# Patient Record
Sex: MALE | URBAN - METROPOLITAN AREA
[De-identification: names, ages, dates, MRNs, and addresses within clinical notes are randomized per-mention and may not be internally consistent; named-entity substitution may affect disease eponyms.]

---

## 2024-12-27 ENCOUNTER — ALLIED HEALTH (OUTPATIENT)
Dept: INTEGRATIVE MEDICINE | Facility: CLINIC | Age: 74
End: 2024-12-27
Payer: MEDICARE

## 2024-12-27 DIAGNOSIS — G89.29 CHRONIC BILATERAL LOW BACK PAIN WITHOUT SCIATICA: ICD-10-CM

## 2024-12-27 DIAGNOSIS — M54.50 CHRONIC BILATERAL LOW BACK PAIN WITHOUT SCIATICA: ICD-10-CM

## 2024-12-27 DIAGNOSIS — M99.03 SEGMENTAL AND SOMATIC DYSFUNCTION OF LUMBAR REGION: Primary | ICD-10-CM

## 2024-12-27 DIAGNOSIS — M99.02 SEGMENTAL AND SOMATIC DYSFUNCTION OF THORACIC REGION: ICD-10-CM

## 2024-12-27 DIAGNOSIS — M53.3 SACRAL BACK PAIN: ICD-10-CM

## 2024-12-27 DIAGNOSIS — M79.9 POSTURAL STRAIN: ICD-10-CM

## 2024-12-27 DIAGNOSIS — M79.10 MYALGIA: ICD-10-CM

## 2024-12-27 DIAGNOSIS — M99.04 SEGMENTAL AND SOMATIC DYSFUNCTION OF SACRAL REGION: ICD-10-CM

## 2024-12-27 PROCEDURE — 98941 CHIROPRACT MANJ 3-4 REGIONS: CPT

## 2024-12-27 NOTE — PROGRESS NOTES
"Subjective   Patient ID: Antonio Gilliam is a 74 y.o. male who presents December 27, 2024 for chiropractic care.    (1/ ) VPCY    Today, the patient rates their degree of pain as a 3 out of 10 on the numeric pain rating scale.     Antonio returns today for continued chiropractic care. He was last seen two years ago, by my colleague Dr. Crane. He was initially being treated for low back pain, complicated by multiple bladder surgeries and radiation therapy for prostate cancer which is now in remission. He had blood testing recently which he states shows gradual decreases in prostate cancer markers. He denies any new imaging since he was last seen in this office, and denies seeing other chiropractors between his last appointment and today. He denies any changes in bowel or bladder control related to his current complaint of low back pain, and denies numbness, tingling, or weakness into lower extremities. The low back pain started again recently but is relatively mild, he reports mostly noticing a tightness and some limited movement, and reports sometimes feeling like something in his low back is going to \"pop\". He enjoyed the work that Dr. Crane had done on his low back previously, and states he found relief from pain with chiropractic care.     He does report having physical therapy for his right knee previously at [facility]. The patient denies any changes in health since his last encounter and will follow up as scheduled.    ______________________________________________________  INITIAL EVALUATION 10/6/2022 (SP):  Provocative factors include bending and transitions.   Palliative factors include rest and stretching.   Pain is described as ache and sharp.   Patient denies instability, grinding, popping, difficulty walking and weakness or numbness.      Pre-Treatment Pain Level: 5/10.   Mr. Alvarez presents today for the chiropractic evaluation and treatment of his back pain.      He explains that he has been to the " chiropractor in the past for many years whenever his back or any other musculoskeletal problems arose. However, he has not been to the chiropractor in at least 3 years for any type of treatments. He explains that over the last few years he has had some other health issues which took priority including surgery to remove his prostate in 2020 in which they also found a hernia which was repaired during surgery. Bladder/sling surgery in 2021 in which they found a hernia again and corrected it during the surgery, he had 2 months of radiation to the prostate this summer and is scheduled again for a third umbilical hernia surgery November 1.     With all of this he has had bouts of inactivity and recovery which has led to likely weakening of his pelvic floor core and lower back muscles. He explains that the last time he had a back issue it was after picking up a piece of paper from the floor. This current episode occurred on September 14 while washing his car. He explains that the sharp pain comes when he is standing up from a seated position or any other transitions. Pain is not localized to one side and is more so felt across the entire lumbosacral region. There is no current leg pain or pain in the glutes, but in the past he has had right sided sciatica.     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      Additional medical history: He takes a number of medications for his type 2 diabetes high lipids/cholesterol, high blood pressure and overactive bladder. He also explains that he was born with asthma although he is not currently actively receiving treatment for this condition           Objective   Physical Exam  Musculoskeletal:         General: No swelling, deformity or signs of injury.        Back:    Neurological:      General: No focal deficit present.      Mental Status: He is alert and  oriented to person, place, and time.      Cranial Nerves: No dysarthria or facial asymmetry.      Sensory: Sensation is intact.      Motor: Motor function is intact.      Coordination: Coordination is intact.      Gait: Gait is intact. Gait normal.         Palpation of the following regions revealed:  Thoracic: Thoracic paraspinals bilateral, muscular hypertonicity.  Middle trapezius bilateral, muscular hypertonicity.  Lower trapezius bilateral, muscular hypertonicity.  Rhomboids bilateral, muscular hypertonicity.  Lumbar: Lumbar paraspinals bilateral, muscular hypertonicity.  Quadratus lumborum bilateral, muscular hypertonicity.  Gluteal bilateral, hypertonicity and tenderness.  Piriformis bilateral, hypertonicity and tenderness.    Segmental Joint(s): Segmental joint dysfunction was assessed with motion palpation and is identified in the following areas:  Thoracic : T5, T7, T8, and T9  Lumbopelvic / Sacral SIJ : L3, L4, L5/S1, R SIJ, and L SIJ    Assessment/Plan   Today's Treatment Included: Spinal manipulation to the following regions of segmental dysfunction identified on examination, using age-appropriate and injury specific force.  Segmental Joint(s) Thoracic : T5, T7, T8, and T9 Segmental Joint(s) Lumbopelvic/Sacral SIJ : L3, L4, L5/S1, R SIJ, and L SIJ  Chiropractic manipulation treatment techniques utilized: Diversified CMT, Pelvic drop table technique, Flexion-distraction technique, and Direct Non-force/Joint Mobilization technique    Soft-tissue mobilization was performed in the following areas:  Thoracic Paraspinal mm. bilateral, Middle Trapezius bilateral, Lower Trapezius bilateral, and Rhomboids bilateral  Lumbar Paraspinal mm. bilateral, Quadratus Lumborum bilateral, Gluteal mm. Glute. Max.  and Glute. Med. bilateral, and Piriformis bilateral    Recommended follow-up in 2 week(s).     The patient tolerated today's treatment with little or no additional discomfort and was instructed to contact the  office for questions or concerns.

## 2025-01-03 ENCOUNTER — APPOINTMENT (OUTPATIENT)
Dept: INTEGRATIVE MEDICINE | Facility: CLINIC | Age: 75
End: 2025-01-03
Payer: MEDICARE

## 2025-01-03 DIAGNOSIS — M99.02 SEGMENTAL AND SOMATIC DYSFUNCTION OF THORACIC REGION: ICD-10-CM

## 2025-01-03 DIAGNOSIS — M79.10 MYALGIA: ICD-10-CM

## 2025-01-03 DIAGNOSIS — M79.9 POSTURAL STRAIN: ICD-10-CM

## 2025-01-03 DIAGNOSIS — M53.3 SACRAL BACK PAIN: ICD-10-CM

## 2025-01-03 DIAGNOSIS — M99.04 SEGMENTAL AND SOMATIC DYSFUNCTION OF SACRAL REGION: ICD-10-CM

## 2025-01-03 DIAGNOSIS — G89.29 CHRONIC BILATERAL LOW BACK PAIN WITHOUT SCIATICA: ICD-10-CM

## 2025-01-03 DIAGNOSIS — M99.03 SEGMENTAL AND SOMATIC DYSFUNCTION OF LUMBAR REGION: Primary | ICD-10-CM

## 2025-01-03 DIAGNOSIS — M54.50 CHRONIC BILATERAL LOW BACK PAIN WITHOUT SCIATICA: ICD-10-CM

## 2025-01-03 PROCEDURE — 98941 CHIROPRACT MANJ 3-4 REGIONS: CPT

## 2025-01-03 NOTE — PROGRESS NOTES
"Subjective   Patient ID: Antonio Gilliam is a 74 y.o. male who presents January 3, 2025 for chiropractic care.    (1/ ) VPCY    Today, the patient rates their degree of pain as a 3 out of 10 on the numeric pain rating scale.     Antonio \"Bill\" returns today for continued chiropractic care. He states he felt \"pretty good\" after his last appointment. He was sore for a day and then felt better up until a couple days ago, when he started having some pain in his R buttock again. No numbness, tingling, or weakness into leg.     _____________________________________  Last visit (12/27/24):  Antonio returns today for continued chiropractic care. He was last seen two years ago, by my colleague Dr. Crane. He was initially being treated for low back pain, complicated by multiple bladder surgeries and radiation therapy for prostate cancer which is now in remission. He had blood testing recently which he states shows gradual decreases in prostate cancer markers. He denies any new imaging since he was last seen in this office, and denies seeing other chiropractors between his last appointment and today. He denies any changes in bowel or bladder control related to his current complaint of low back pain, and denies numbness, tingling, or weakness into lower extremities. The low back pain started again recently but is relatively mild, he reports mostly noticing a tightness and some limited movement, and reports sometimes feeling like something in his low back is going to \"pop\". He enjoyed the work that Dr. Crane had done on his low back previously, and states he found relief from pain with chiropractic care.     He does report having physical therapy for his right knee previously at [facility]. The patient denies any changes in health since his last encounter and will follow up as scheduled.    ______________________________________________________  INITIAL EVALUATION 10/6/2022 (SP):  Provocative factors include bending and transitions. "   Palliative factors include rest and stretching.   Pain is described as ache and sharp.   Patient denies instability, grinding, popping, difficulty walking and weakness or numbness.      Pre-Treatment Pain Level: 5/10.   Mr. Alvarez presents today for the chiropractic evaluation and treatment of his back pain.      He explains that he has been to the chiropractor in the past for many years whenever his back or any other musculoskeletal problems arose. However, he has not been to the chiropractor in at least 3 years for any type of treatments. He explains that over the last few years he has had some other health issues which took priority including surgery to remove his prostate in 2020 in which they also found a hernia which was repaired during surgery. Bladder/sling surgery in 2021 in which they found a hernia again and corrected it during the surgery, he had 2 months of radiation to the prostate this summer and is scheduled again for a third umbilical hernia surgery November 1.     With all of this he has had bouts of inactivity and recovery which has led to likely weakening of his pelvic floor core and lower back muscles. He explains that the last time he had a back issue it was after picking up a piece of paper from the floor. This current episode occurred on September 14 while washing his car. He explains that the sharp pain comes when he is standing up from a seated position or any other transitions. Pain is not localized to one side and is more so felt across the entire lumbosacral region. There is no current leg pain or pain in the glutes, but in the past he has had right sided sciatica.     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      Additional medical history: He takes a number of medications for his type 2 diabetes high lipids/cholesterol, high blood pressure and overactive  bladder. He also explains that he was born with asthma although he is not currently actively receiving treatment for this condition           Objective   Physical Exam  Musculoskeletal:         General: No swelling, deformity or signs of injury.        Back:    Neurological:      General: No focal deficit present.      Mental Status: He is alert and oriented to person, place, and time.      Cranial Nerves: No dysarthria or facial asymmetry.      Sensory: Sensation is intact.      Motor: Motor function is intact.      Coordination: Coordination is intact.      Gait: Gait is intact. Gait normal.         Palpation of the following regions revealed:  Thoracic: Thoracic paraspinals bilateral, muscular hypertonicity.  Middle trapezius bilateral, muscular hypertonicity.  Lower trapezius bilateral, muscular hypertonicity.  Rhomboids bilateral, muscular hypertonicity.  Lumbar: Lumbar paraspinals bilateral, muscular hypertonicity.  Quadratus lumborum bilateral, muscular hypertonicity.  Gluteal right, muscular hypertonicity.  Piriformis right, muscular hypertonicity.    Segmental Joint(s): Segmental joint dysfunction was assessed with motion palpation and is identified in the following areas:  Thoracic : T5, T7, T8, and T9  Lumbopelvic / Sacral SIJ : L3, L4, L5/S1, R SIJ, and L SIJ    Assessment/Plan   Today's Treatment Included: Spinal manipulation to the following regions of segmental dysfunction identified on examination, using age-appropriate and injury specific force.  Segmental Joint(s) Thoracic : T4, T6, T7, and T8 Segmental Joint(s) Lumbopelvic/Sacral SIJ : L3, L4, L5/S1, R SIJ, and L SIJ  Chiropractic manipulation treatment techniques utilized: Diversified CMT, Pelvic drop table technique, Flexion-distraction technique, and Direct Non-force/Joint Mobilization technique  Soft tissue mobilization techniques utilized during treatment: Active Release Technique and Ischemic compression    Soft-tissue mobilization was  performed in the following areas:  Thoracic Paraspinal mm. bilateral, Middle Trapezius bilateral, Lower Trapezius bilateral, and Rhomboids bilateral  Lumbar Paraspinal mm. bilateral, Quadratus Lumborum bilateral, Gluteal mm. Glute. Max.  and Glute. Med. bilateral, and Piriformis bilateral    Recommended follow-up in 2 week(s).     The patient tolerated today's treatment with little or no additional discomfort and was instructed to contact the office for questions or concerns.

## 2025-01-10 ENCOUNTER — APPOINTMENT (OUTPATIENT)
Dept: INTEGRATIVE MEDICINE | Facility: CLINIC | Age: 75
End: 2025-01-10
Payer: MEDICARE

## 2025-01-10 DIAGNOSIS — G89.29 CHRONIC BILATERAL LOW BACK PAIN WITHOUT SCIATICA: ICD-10-CM

## 2025-01-10 DIAGNOSIS — M53.3 SACRAL BACK PAIN: ICD-10-CM

## 2025-01-10 DIAGNOSIS — M99.02 SEGMENTAL AND SOMATIC DYSFUNCTION OF THORACIC REGION: ICD-10-CM

## 2025-01-10 DIAGNOSIS — M54.50 CHRONIC BILATERAL LOW BACK PAIN WITHOUT SCIATICA: ICD-10-CM

## 2025-01-10 DIAGNOSIS — M79.9 POSTURAL STRAIN: ICD-10-CM

## 2025-01-10 DIAGNOSIS — M99.04 SEGMENTAL AND SOMATIC DYSFUNCTION OF SACRAL REGION: ICD-10-CM

## 2025-01-10 DIAGNOSIS — M99.03 SEGMENTAL AND SOMATIC DYSFUNCTION OF LUMBAR REGION: Primary | ICD-10-CM

## 2025-01-10 DIAGNOSIS — M79.10 MYALGIA: ICD-10-CM

## 2025-01-10 PROCEDURE — 98941 CHIROPRACT MANJ 3-4 REGIONS: CPT

## 2025-01-10 NOTE — PROGRESS NOTES
"Subjective   Patient ID: Antonio Gilliam is a 74 y.o. male who presents January 10, 2025 for chiropractic care.    (3) VPCY    Today, the patient rates their degree of pain as a 3 out of 10 on the numeric pain rating scale.     Chalo returns today for continued care of his low back pain. He has been feeling good still, just wanted to have one more visit before he leaves for [place] next week. He will be gone for 3 months and we will follow up when he returns from his trip.   ________________________________  Last visit (1/3/24):  Antonio \"Chalo\" returns today for continued chiropractic care. He states he felt \"pretty good\" after his last appointment. He was sore for a day and then felt better up until a couple days ago, when he started having some pain in his R buttock again. No numbness, tingling, or weakness into leg.            Objective   Physical Exam  Musculoskeletal:         General: No swelling, deformity or signs of injury.        Back:    Neurological:      General: No focal deficit present.      Mental Status: He is alert and oriented to person, place, and time.      Cranial Nerves: No dysarthria or facial asymmetry.      Sensory: Sensation is intact.      Motor: Motor function is intact.      Coordination: Coordination is intact.      Gait: Gait is intact. Gait normal.       Palpation of the following regions revealed:  Thoracic: Thoracic paraspinals bilateral, muscular hypertonicity.  Middle trapezius bilateral, muscular hypertonicity.  Lower trapezius bilateral, muscular hypertonicity.  Rhomboids bilateral, muscular hypertonicity.  Lumbar: Lumbar paraspinals bilateral, muscular hypertonicity.  Quadratus lumborum bilateral, muscular hypertonicity.  Gluteal right, muscular hypertonicity.  Piriformis right, muscular hypertonicity.    Segmental Joint(s): Segmental joint dysfunction was assessed with motion palpation and is identified in the following areas:  Thoracic : T5, T7, T8, and T9  Lumbopelvic / Sacral SIJ " : L3, L4, L5/S1, R SIJ, and L SIJ    Assessment/Plan   Today's Treatment Included: Spinal manipulation to the following regions of segmental dysfunction identified on examination, using age-appropriate and injury specific force.  Segmental Joint(s) Thoracic : T7, T8, and T9 Segmental Joint(s) Lumbopelvic/Sacral SIJ : L3, L4, L5/S1, and R SIJ  Chiropractic manipulation treatment techniques utilized: Diversified CMT, Pelvic drop table technique, Flexion-distraction technique, and Direct Non-force/Joint Mobilization technique  Soft tissue mobilization techniques utilized during treatment: Active Release Technique and Ischemic compression    Soft-tissue mobilization was performed in the following areas:  Thoracic Paraspinal mm. bilateral, Middle Trapezius bilateral, Lower Trapezius bilateral, and Rhomboids bilateral  Lumbar Paraspinal mm. bilateral, Quadratus Lumborum bilateral, Gluteal mm. Glute. Max.  and Glute. Med. bilateral, and Piriformis bilateral    Recommended follow-up in 2 week(s).     The patient tolerated today's treatment with little or no additional discomfort and was instructed to contact the office for questions or concerns.

## 2025-05-05 ENCOUNTER — APPOINTMENT (OUTPATIENT)
Dept: INTEGRATIVE MEDICINE | Facility: CLINIC | Age: 75
End: 2025-05-05
Payer: MEDICARE

## 2025-05-05 DIAGNOSIS — M53.3 SACRAL BACK PAIN: ICD-10-CM

## 2025-05-05 DIAGNOSIS — M99.02 SEGMENTAL AND SOMATIC DYSFUNCTION OF THORACIC REGION: ICD-10-CM

## 2025-05-05 DIAGNOSIS — M54.50 CHRONIC BILATERAL LOW BACK PAIN WITHOUT SCIATICA: ICD-10-CM

## 2025-05-05 DIAGNOSIS — M79.9 POSTURAL STRAIN: ICD-10-CM

## 2025-05-05 DIAGNOSIS — G89.29 CHRONIC BILATERAL LOW BACK PAIN WITHOUT SCIATICA: ICD-10-CM

## 2025-05-05 DIAGNOSIS — M79.10 MYALGIA: ICD-10-CM

## 2025-05-05 DIAGNOSIS — M99.04 SEGMENTAL AND SOMATIC DYSFUNCTION OF SACRAL REGION: ICD-10-CM

## 2025-05-05 DIAGNOSIS — M99.03 SEGMENTAL AND SOMATIC DYSFUNCTION OF LUMBAR REGION: Primary | ICD-10-CM

## 2025-05-05 PROCEDURE — 98941 CHIROPRACT MANJ 3-4 REGIONS: CPT

## 2025-05-05 NOTE — PROGRESS NOTES
"Subjective   Patient ID: Antonio Gilliam \"Chalo\" is a 74 y.o. male who presents May 5, 2025 for chiropractic care.    (3) VPCY    Today, the patient rates their degree of pain as a 5 out of 10 on the numeric pain rating scale.     Chalo returned from his trips to [place] and [place]. He has been having more low back pain, mentions it was particularly bad on Friday. It is localized primarily to the right side of his low back and into his glute. He thinks it may be related to being more active since he has been back. We did dry needle today, was a little sore leaving appointment. Discussed following up again within a week or two depending on how he is feeling in the next few days.   ___________________________  Last visit (1/10/2025):  Chalo returns today for continued care of his low back pain. He has been feeling good still, just wanted to have one more visit before he leaves for [place] next week. He will be gone for 3 months and we will follow up when he returns from his trip.          Objective   Physical Exam  Musculoskeletal:         General: No swelling, deformity or signs of injury.        Back:    Neurological:      General: No focal deficit present.      Mental Status: He is alert and oriented to person, place, and time.      Cranial Nerves: No dysarthria or facial asymmetry.      Sensory: Sensation is intact.      Motor: Motor function is intact.      Coordination: Coordination is intact.      Gait: Gait is intact. Gait normal.         Palpation of the following regions revealed:  Thoracic: Thoracic paraspinals bilateral, muscular hypertonicity.  Middle trapezius bilateral, muscular hypertonicity.  Lower trapezius bilateral, muscular hypertonicity.  Rhomboids bilateral, muscular hypertonicity.  Lumbar: Lumbar paraspinals bilateral, muscular hypertonicity.  Quadratus lumborum bilateral, muscular hypertonicity.  Gluteal right, muscular hypertonicity.  Piriformis right, muscular hypertonicity.    Segmental Joint(s): " Segmental joint dysfunction was assessed with motion palpation and is identified in the following areas:  Thoracic : T5, T7, T8, and T9  Lumbopelvic / Sacral SIJ : L2, L3, L4, L5/S1, R SIJ, and L SIJ    Assessment/Plan   Today's Treatment Included: Spinal manipulation to the following regions of segmental dysfunction identified on examination, using age-appropriate and injury specific force.  Segmental Joint(s) Thoracic : T5, T7, T8, and T9 Segmental Joint(s) Lumbopelvic/Sacral SIJ : L2, L3, L4, L5/S1, and R SIJ  Chiropractic manipulation treatment techniques utilized: Diversified CMT, Pelvic drop table technique, Flexion-distraction technique, and Direct Non-force/Joint Mobilization technique  Soft tissue mobilization techniques utilized during treatment: Active Release Technique and Ischemic compression  Integrative Dry Needling (IDN) - Needles in/out:  9.    IDN prone B lumbar PS, B gluteal, superior cluneal, R piriformis    Soft-tissue mobilization was performed in the following areas:  Thoracic Paraspinal mm. bilateral, Middle Trapezius bilateral, Lower Trapezius bilateral, and Rhomboids bilateral  Lumbar Paraspinal mm. bilateral, Quadratus Lumborum bilateral, Gluteal mm. Glute. Max.  and Glute. Med. bilateral, and Piriformis bilateral    Recommended follow-up in 2 week(s).     The patient tolerated today's treatment with little or no additional discomfort and was instructed to contact the office for questions or concerns.

## 2025-05-13 ENCOUNTER — ALLIED HEALTH (OUTPATIENT)
Dept: INTEGRATIVE MEDICINE | Facility: CLINIC | Age: 75
End: 2025-05-13
Payer: MEDICARE

## 2025-05-13 DIAGNOSIS — M99.03 SEGMENTAL AND SOMATIC DYSFUNCTION OF LUMBAR REGION: Primary | ICD-10-CM

## 2025-05-13 DIAGNOSIS — M53.3 SACRAL BACK PAIN: ICD-10-CM

## 2025-05-13 DIAGNOSIS — M54.50 CHRONIC BILATERAL LOW BACK PAIN WITHOUT SCIATICA: ICD-10-CM

## 2025-05-13 DIAGNOSIS — M99.02 SEGMENTAL AND SOMATIC DYSFUNCTION OF THORACIC REGION: ICD-10-CM

## 2025-05-13 DIAGNOSIS — G89.29 CHRONIC BILATERAL LOW BACK PAIN WITHOUT SCIATICA: ICD-10-CM

## 2025-05-13 DIAGNOSIS — M79.10 MYALGIA: ICD-10-CM

## 2025-05-13 DIAGNOSIS — M79.9 POSTURAL STRAIN: ICD-10-CM

## 2025-05-13 DIAGNOSIS — M99.04 SEGMENTAL AND SOMATIC DYSFUNCTION OF SACRAL REGION: ICD-10-CM

## 2025-05-13 PROCEDURE — 98941 CHIROPRACT MANJ 3-4 REGIONS: CPT

## 2025-05-13 NOTE — PROGRESS NOTES
"Subjective   Patient ID: Antonio Gilliam \"Chalo\" is a 74 y.o. male who presents May 13, 2025 for chiropractic care.    (4) VPCY    Today, the patient rates their degree of pain as a 7 out of 10 on the numeric pain rating scale.     Chalo has had some additional low back pain since his last visit. It is localized to the left side of the lumbar spine. He is not sure what may have caused this, but we will follow up sooner to make sure we stay on top of it.   _____________________________  Last visit (5/5/2025):  Chalo returned from his trips to [place] and [place]. He has been having more low back pain, mentions it was particularly bad on Friday. It is localized primarily to the right side of his low back and into his glute. He thinks it may be related to being more active since he has been back. We did dry needle today, was a little sore leaving appointment. Discussed following up again within a week or two depending on how he is feeling in the next few days.          Objective   Physical Exam  Musculoskeletal:         General: No swelling, deformity or signs of injury.        Back:    Neurological:      General: No focal deficit present.      Mental Status: He is alert and oriented to person, place, and time.      Cranial Nerves: No dysarthria or facial asymmetry.      Sensory: Sensation is intact.      Motor: Motor function is intact.      Coordination: Coordination is intact.      Gait: Gait is intact. Gait normal.         Palpation of the following regions revealed:  Thoracic: Thoracic paraspinals bilateral, muscular hypertonicity.  Middle trapezius bilateral, muscular hypertonicity.  Lower trapezius bilateral, muscular hypertonicity.  Rhomboids bilateral, muscular hypertonicity.  Lumbar: Lumbar paraspinals left, muscular hypertonicity.  Quadratus lumborum left, muscular hypertonicity.  Thoracolumbar fascia left, muscular hypertonicity.  Gluteal left, muscular hypertonicity.  Piriformis left, muscular " hypertonicity.    Segmental Joint(s): Segmental joint dysfunction was assessed with motion palpation and is identified in the following areas:  Thoracic : T7, T8, and T9  Lumbopelvic / Sacral SIJ : L2, L3, L4, L5/S1, R SIJ, and L SIJ    Assessment/Plan   Today's Treatment Included: Spinal manipulation to the following regions of segmental dysfunction identified on examination, using age-appropriate and injury specific force.  Segmental Joint(s) Thoracic : T7, T8, and T9 Segmental Joint(s) Lumbopelvic/Sacral SIJ : L2, L3, L4, L5/S1, and L SIJ  Chiropractic manipulation treatment techniques utilized: Diversified CMT, Pelvic drop table technique, Flexion-distraction technique, and Direct Non-force/Joint Mobilization technique  Soft tissue mobilization techniques utilized during treatment: Active Release Technique and Ischemic compression  Integrative Dry Needling (IDN) - Needles in/out:  6.    IDN prone B lumbar PS, L gluteal, L superior cluneal    Soft-tissue mobilization was performed in the following areas:  Thoracic Paraspinal mm. bilateral, Middle Trapezius bilateral, Lower Trapezius bilateral, and Rhomboids bilateral  Lumbar Paraspinal mm. bilateral, Quadratus Lumborum bilateral, Gluteal mm. Glute. Max.  and Glute. Med. bilateral, and Piriformis bilateral    Recommended follow-up in 1 week(s).     The patient tolerated today's treatment with little or no additional discomfort and was instructed to contact the office for questions or concerns.

## 2025-05-16 ENCOUNTER — APPOINTMENT (OUTPATIENT)
Dept: INTEGRATIVE MEDICINE | Facility: CLINIC | Age: 75
End: 2025-05-16
Payer: MEDICARE

## 2025-05-19 ENCOUNTER — APPOINTMENT (OUTPATIENT)
Dept: INTEGRATIVE MEDICINE | Facility: CLINIC | Age: 75
End: 2025-05-19
Payer: MEDICARE

## 2025-05-19 DIAGNOSIS — M99.02 SEGMENTAL AND SOMATIC DYSFUNCTION OF THORACIC REGION: ICD-10-CM

## 2025-05-19 DIAGNOSIS — M54.50 CHRONIC BILATERAL LOW BACK PAIN WITHOUT SCIATICA: ICD-10-CM

## 2025-05-19 DIAGNOSIS — M99.03 SEGMENTAL AND SOMATIC DYSFUNCTION OF LUMBAR REGION: Primary | ICD-10-CM

## 2025-05-19 DIAGNOSIS — M79.9 POSTURAL STRAIN: ICD-10-CM

## 2025-05-19 DIAGNOSIS — M53.3 SACRAL BACK PAIN: ICD-10-CM

## 2025-05-19 DIAGNOSIS — M79.10 MYALGIA: ICD-10-CM

## 2025-05-19 DIAGNOSIS — M99.04 SEGMENTAL AND SOMATIC DYSFUNCTION OF SACRAL REGION: ICD-10-CM

## 2025-05-19 DIAGNOSIS — G89.29 CHRONIC BILATERAL LOW BACK PAIN WITHOUT SCIATICA: ICD-10-CM

## 2025-05-19 PROCEDURE — 98941 CHIROPRACT MANJ 3-4 REGIONS: CPT

## 2025-05-19 NOTE — PROGRESS NOTES
"Subjective   Patient ID: Antonio Gilliam \"Chalo\" is a 74 y.o. male who presents May 19, 2025 for chiropractic care.    (5) VPCY    Today, the patient rates their degree of pain as a 4 out of 10 on the numeric pain rating scale.     Chalo has minimal pain today compared to our last couple of visits, still tension present but not as bad as it has been. 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   _______________________________  Last visit (5/13/2025):  Chalo has had some additional low back pain since his last visit. It is localized to the left side of the lumbar spine. He is not sure what may have caused this, but we will follow up sooner to make sure we stay on top of it.          Objective   Physical Exam  Musculoskeletal:         General: No swelling, deformity or signs of injury.        Back:    Neurological:      General: No focal deficit present.      Mental Status: He is alert and oriented to person, place, and time.      Cranial Nerves: No dysarthria or facial asymmetry.      Sensory: Sensation is intact.      Motor: Motor function is intact.      Coordination: Coordination is intact.      Gait: Gait is intact. Gait normal.       Palpation of the following regions revealed:  Thoracic: Thoracic paraspinals bilateral, muscular hypertonicity.  Middle trapezius bilateral, muscular hypertonicity.  Lower trapezius bilateral, muscular hypertonicity.  Rhomboids bilateral, muscular hypertonicity.  Lumbar: Lumbar paraspinals bilateral, muscular hypertonicity.  Quadratus lumborum bilateral, muscular hypertonicity.  Thoracolumbar fascia bilateral, muscular hypertonicity.  Gluteal bilateral, muscular hypertonicity.  Piriformis bilateral, muscular hypertonicity.    Segmental Joint(s): Segmental joint dysfunction was assessed with motion palpation and is " identified in the following areas:  Thoracic : T5, T7, T8, and T9  Lumbopelvic / Sacral SIJ : L2, L3, L4, L5/S1, R SIJ, and L SIJ    Assessment/Plan   Today's Treatment Included: Spinal manipulation to the following regions of segmental dysfunction identified on examination, using age-appropriate and injury specific force.  Segmental Joint(s) Thoracic : T5, T7, T8, and T9 Segmental Joint(s) Lumbopelvic/Sacral SIJ : L2, L3, L4, L5/S1, R SIJ, and L SIJ  Chiropractic manipulation treatment techniques utilized: Diversified CMT, Pelvic drop table technique, Flexion-distraction technique, and Low force  Soft tissue mobilization techniques utilized during treatment: Active Release Technique and Ischemic compression  Integrative Dry Needling (IDN) - Needles in/out:  10.    IDN prone B lumbar PS, B gluteal, B superior cluneal    Soft-tissue mobilization was performed in the following areas:  Thoracic Paraspinal mm. bilateral, Middle Trapezius bilateral, Lower Trapezius bilateral, and Rhomboids bilateral  Lumbar Paraspinal mm. bilateral, Quadratus Lumborum bilateral, Gluteal mm. Glute. Max.  and Glute. Med. bilateral, and Piriformis bilateral    Recommended follow-up in 1 month(s).     The patient tolerated today's treatment with little or no additional discomfort and was instructed to contact the office for questions or concerns.

## 2025-06-12 ENCOUNTER — ALLIED HEALTH (OUTPATIENT)
Dept: INTEGRATIVE MEDICINE | Facility: CLINIC | Age: 75
End: 2025-06-12
Payer: MEDICARE

## 2025-06-12 DIAGNOSIS — M79.9 POSTURAL STRAIN: ICD-10-CM

## 2025-06-12 DIAGNOSIS — M99.04 SEGMENTAL AND SOMATIC DYSFUNCTION OF SACRAL REGION: ICD-10-CM

## 2025-06-12 DIAGNOSIS — M54.50 CHRONIC BILATERAL LOW BACK PAIN WITHOUT SCIATICA: ICD-10-CM

## 2025-06-12 DIAGNOSIS — M79.10 MYALGIA: ICD-10-CM

## 2025-06-12 DIAGNOSIS — M99.02 SEGMENTAL AND SOMATIC DYSFUNCTION OF THORACIC REGION: ICD-10-CM

## 2025-06-12 DIAGNOSIS — G89.29 CHRONIC BILATERAL LOW BACK PAIN WITHOUT SCIATICA: ICD-10-CM

## 2025-06-12 DIAGNOSIS — M53.3 SACRAL BACK PAIN: ICD-10-CM

## 2025-06-12 DIAGNOSIS — M99.03 SEGMENTAL AND SOMATIC DYSFUNCTION OF LUMBAR REGION: Primary | ICD-10-CM

## 2025-06-12 PROCEDURE — 98941 CHIROPRACT MANJ 3-4 REGIONS: CPT

## 2025-06-12 NOTE — PROGRESS NOTES
"Subjective   Patient ID: Antonio Gilliam \"Chalo\" is a 74 y.o. male who presents June 12, 2025 for chiropractic care.    (6) VPCY    Today, the patient rates their degree of pain as a 1 out of 10 on the numeric pain rating scale.     Chalo states he has been doing pretty well, has not been having a ton of low back pain, very minimal tension. He has been feeling good, wanted to come for a more preventative type of appointment. Nothing new to report.   _____________________________  Last visit (5/19/2025):  Chalo has minimal pain today compared to our last couple of visits, still tension present but not as bad as it has been. 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          Objective   Physical Exam  Musculoskeletal:         General: No swelling, deformity or signs of injury.        Back:    Neurological:      General: No focal deficit present.      Mental Status: He is alert and oriented to person, place, and time.      Cranial Nerves: No dysarthria or facial asymmetry.      Sensory: Sensation is intact.      Motor: Motor function is intact.      Coordination: Coordination is intact.      Gait: Gait is intact. Gait normal.       Palpation of the following regions revealed:  Thoracic: Thoracic paraspinals bilateral, muscular hypertonicity.  Middle trapezius bilateral, muscular hypertonicity.  Lower trapezius bilateral, muscular hypertonicity.  Rhomboids bilateral, muscular hypertonicity.  Lumbar: Lumbar paraspinals bilateral, muscular hypertonicity.  Quadratus lumborum bilateral, muscular hypertonicity.  Thoracolumbar fascia bilateral, muscular hypertonicity.  Gluteal bilateral, muscular hypertonicity.  Piriformis bilateral, muscular hypertonicity.    Segmental Joint(s): Segmental joint dysfunction was assessed with motion palpation and is identified " in the following areas:  Thoracic : T5, T7, T8, and T9  Lumbopelvic / Sacral SIJ : L2, L3, L4, L5/S1, R SIJ, and L SIJ    Assessment/Plan   Today's Treatment Included: Spinal manipulation to the following regions of segmental dysfunction identified on examination, using age-appropriate and injury specific force.  Segmental Joint(s) Thoracic : T5, T7, T8, and T9 Segmental Joint(s) Lumbopelvic/Sacral SIJ : L2, L3, L4, L5/S1, R SIJ, and L SIJ  Chiropractic manipulation treatment techniques utilized: Diversified CMT, Pelvic drop table technique, Flexion-distraction technique, and Low force  Soft tissue mobilization techniques utilized during treatment: Active Release Technique, IASTM/Graston, and Ischemic compression    Soft-tissue mobilization was performed in the following areas:  Thoracic Paraspinal mm. bilateral, Middle Trapezius bilateral, Lower Trapezius bilateral, and Rhomboids bilateral  Lumbar Paraspinal mm. bilateral, Quadratus Lumborum bilateral, Gluteal mm. Glute. Max.  and Glute. Med. bilateral, and Piriformis bilateral    Recommended follow-up in 1 month(s).     The patient tolerated today's treatment with little or no additional discomfort and was instructed to contact the office for questions or concerns.

## 2025-06-16 ENCOUNTER — APPOINTMENT (OUTPATIENT)
Dept: INTEGRATIVE MEDICINE | Facility: CLINIC | Age: 75
End: 2025-06-16
Payer: MEDICARE